# Patient Record
Sex: MALE | Race: WHITE | NOT HISPANIC OR LATINO | Employment: FULL TIME | URBAN - NONMETROPOLITAN AREA
[De-identification: names, ages, dates, MRNs, and addresses within clinical notes are randomized per-mention and may not be internally consistent; named-entity substitution may affect disease eponyms.]

---

## 2017-01-18 DIAGNOSIS — N40.0 BENIGN NON-NODULAR PROSTATIC HYPERPLASIA WITHOUT LOWER URINARY TRACT SYMPTOMS: ICD-10-CM

## 2017-01-18 DIAGNOSIS — E55.9 VITAMIN D DEFICIENCY DISEASE: ICD-10-CM

## 2017-01-18 DIAGNOSIS — Z12.11 SCREENING FOR COLON CANCER: ICD-10-CM

## 2017-01-18 DIAGNOSIS — E78.5 HYPERLIPIDEMIA, UNSPECIFIED HYPERLIPIDEMIA TYPE: Primary | ICD-10-CM

## 2017-01-19 ENCOUNTER — TELEPHONE (OUTPATIENT)
Dept: INTERNAL MEDICINE | Facility: CLINIC | Age: 72
End: 2017-01-19

## 2017-01-19 NOTE — TELEPHONE ENCOUNTER
----- Message from Boni Pearson MD sent at 1/18/2017  5:32 PM EST -----  Contact: PATIENT  Lab order placed  ----- Message -----     From: Rama Coelho MA     Sent: 1/18/2017  11:30 AM       To: Boni Pearson MD        ----- Message -----     From: Dragan Arndt     Sent: 1/18/2017   8:53 AM       To: Rama Coeloh MA    Patient scheduled and annual physical in March. He is inquiring if Dr. Pearson will place his blood work a week in advanced so that he can get this done and have the results when he arrives for his appointment. States this is what he has done for the past 6 or 7 years. I told him him you would give him a call to advise.

## 2017-03-16 ENCOUNTER — LAB (OUTPATIENT)
Dept: INTERNAL MEDICINE | Facility: CLINIC | Age: 72
End: 2017-03-16

## 2017-03-16 DIAGNOSIS — N40.0 BENIGN NON-NODULAR PROSTATIC HYPERPLASIA WITHOUT LOWER URINARY TRACT SYMPTOMS: ICD-10-CM

## 2017-03-16 DIAGNOSIS — E55.9 VITAMIN D DEFICIENCY DISEASE: ICD-10-CM

## 2017-03-16 DIAGNOSIS — E78.5 HYPERLIPIDEMIA, UNSPECIFIED HYPERLIPIDEMIA TYPE: ICD-10-CM

## 2017-03-16 LAB
25(OH)D3 SERPL-MCNC: 30.4 NG/ML
ALBUMIN SERPL-MCNC: 4.7 G/DL (ref 3.2–4.8)
ALBUMIN/GLOB SERPL: 2 G/DL (ref 1.5–2.5)
ALP SERPL-CCNC: 73 U/L (ref 25–100)
ALT SERPL W P-5'-P-CCNC: 15 U/L (ref 7–40)
ANION GAP SERPL CALCULATED.3IONS-SCNC: 9 MMOL/L (ref 3–11)
ARTICHOKE IGE QN: 69 MG/DL (ref 0–130)
AST SERPL-CCNC: 19 U/L (ref 0–33)
BACTERIA UR QL AUTO: ABNORMAL /HPF
BASOPHILS # BLD AUTO: 0.02 10*3/MM3 (ref 0–0.2)
BASOPHILS NFR BLD AUTO: 0.4 % (ref 0–1)
BILIRUB SERPL-MCNC: 0.9 MG/DL (ref 0.3–1.2)
BILIRUB UR QL STRIP: NEGATIVE
BUN BLD-MCNC: 20 MG/DL (ref 9–23)
BUN/CREAT SERPL: 22.2 (ref 7–25)
CALCIUM SPEC-SCNC: 10.4 MG/DL (ref 8.7–10.4)
CHLORIDE SERPL-SCNC: 104 MMOL/L (ref 99–109)
CHOLEST SERPL-MCNC: 136 MG/DL (ref 0–200)
CK SERPL-CCNC: 53 U/L (ref 26–174)
CLARITY UR: CLEAR
CO2 SERPL-SCNC: 29 MMOL/L (ref 20–31)
COLOR UR: YELLOW
CREAT BLD-MCNC: 0.9 MG/DL (ref 0.6–1.3)
DEPRECATED RDW RBC AUTO: 47.8 FL (ref 37–54)
EOSINOPHIL # BLD AUTO: 0.09 10*3/MM3 (ref 0.1–0.3)
EOSINOPHIL NFR BLD AUTO: 1.7 % (ref 0–3)
ERYTHROCYTE [DISTWIDTH] IN BLOOD BY AUTOMATED COUNT: 14 % (ref 11.3–14.5)
GFR SERPL CREATININE-BSD FRML MDRD: 84 ML/MIN/1.73
GLOBULIN UR ELPH-MCNC: 2.4 GM/DL
GLUCOSE BLD-MCNC: 111 MG/DL (ref 70–100)
GLUCOSE UR STRIP-MCNC: NEGATIVE MG/DL
HCT VFR BLD AUTO: 44.3 % (ref 38.9–50.9)
HDLC SERPL-MCNC: 54 MG/DL (ref 40–60)
HGB BLD-MCNC: 14.5 G/DL (ref 13.1–17.5)
HGB UR QL STRIP.AUTO: NEGATIVE
HYALINE CASTS UR QL AUTO: ABNORMAL /LPF
IMM GRANULOCYTES # BLD: 0.01 10*3/MM3 (ref 0–0.03)
IMM GRANULOCYTES NFR BLD: 0.2 % (ref 0–0.6)
KETONES UR QL STRIP: NEGATIVE
LEUKOCYTE ESTERASE UR QL STRIP.AUTO: ABNORMAL
LYMPHOCYTES # BLD AUTO: 1.23 10*3/MM3 (ref 0.6–4.8)
LYMPHOCYTES NFR BLD AUTO: 23.5 % (ref 24–44)
MCH RBC QN AUTO: 30.9 PG (ref 27–31)
MCHC RBC AUTO-ENTMCNC: 32.7 G/DL (ref 32–36)
MCV RBC AUTO: 94.5 FL (ref 80–99)
MONOCYTES # BLD AUTO: 0.49 10*3/MM3 (ref 0–1)
MONOCYTES NFR BLD AUTO: 9.4 % (ref 0–12)
NEUTROPHILS # BLD AUTO: 3.4 10*3/MM3 (ref 1.5–8.3)
NEUTROPHILS NFR BLD AUTO: 64.8 % (ref 41–71)
NITRITE UR QL STRIP: NEGATIVE
NRBC BLD MANUAL-RTO: 0 /100 WBC (ref 0–0)
PH UR STRIP.AUTO: 6 [PH] (ref 5–8)
PLATELET # BLD AUTO: 158 10*3/MM3 (ref 150–450)
PMV BLD AUTO: 11.3 FL (ref 6–12)
POTASSIUM BLD-SCNC: 4.4 MMOL/L (ref 3.5–5.5)
PROT SERPL-MCNC: 7.1 G/DL (ref 5.7–8.2)
PROT UR QL STRIP: NEGATIVE
PSA SERPL-MCNC: 1.2 NG/ML (ref 0–4)
RBC # BLD AUTO: 4.69 10*6/MM3 (ref 4.2–5.76)
RBC # UR: ABNORMAL /HPF
REF LAB TEST METHOD: ABNORMAL
SODIUM BLD-SCNC: 142 MMOL/L (ref 132–146)
SP GR UR STRIP: 1.02 (ref 1–1.03)
SQUAMOUS #/AREA URNS HPF: ABNORMAL /HPF
TRIGL SERPL-MCNC: 76 MG/DL (ref 0–150)
TSH SERPL DL<=0.05 MIU/L-ACNC: 1.41 MIU/ML (ref 0.35–5.35)
UROBILINOGEN UR QL STRIP: ABNORMAL
WBC NRBC COR # BLD: 5.24 10*3/MM3 (ref 3.5–10.8)
WBC UR QL AUTO: ABNORMAL /HPF

## 2017-03-16 PROCEDURE — 82550 ASSAY OF CK (CPK): CPT | Performed by: INTERNAL MEDICINE

## 2017-03-16 PROCEDURE — 80061 LIPID PANEL: CPT | Performed by: INTERNAL MEDICINE

## 2017-03-16 PROCEDURE — 36415 COLL VENOUS BLD VENIPUNCTURE: CPT | Performed by: INTERNAL MEDICINE

## 2017-03-16 PROCEDURE — 81001 URINALYSIS AUTO W/SCOPE: CPT | Performed by: INTERNAL MEDICINE

## 2017-03-16 PROCEDURE — 82306 VITAMIN D 25 HYDROXY: CPT | Performed by: INTERNAL MEDICINE

## 2017-03-16 PROCEDURE — 84153 ASSAY OF PSA TOTAL: CPT | Performed by: INTERNAL MEDICINE

## 2017-03-16 PROCEDURE — 85025 COMPLETE CBC W/AUTO DIFF WBC: CPT | Performed by: INTERNAL MEDICINE

## 2017-03-16 PROCEDURE — 84443 ASSAY THYROID STIM HORMONE: CPT | Performed by: INTERNAL MEDICINE

## 2017-03-16 PROCEDURE — 80053 COMPREHEN METABOLIC PANEL: CPT | Performed by: INTERNAL MEDICINE

## 2017-03-24 ENCOUNTER — OFFICE VISIT (OUTPATIENT)
Dept: INTERNAL MEDICINE | Facility: CLINIC | Age: 72
End: 2017-03-24

## 2017-03-24 VITALS
HEART RATE: 56 BPM | RESPIRATION RATE: 14 BRPM | BODY MASS INDEX: 31.18 KG/M2 | DIASTOLIC BLOOD PRESSURE: 70 MMHG | HEIGHT: 66 IN | WEIGHT: 194 LBS | TEMPERATURE: 98 F | OXYGEN SATURATION: 99 % | SYSTOLIC BLOOD PRESSURE: 120 MMHG

## 2017-03-24 DIAGNOSIS — E11.8 TYPE 2 DIABETES MELLITUS WITH COMPLICATION, WITHOUT LONG-TERM CURRENT USE OF INSULIN (HCC): ICD-10-CM

## 2017-03-24 DIAGNOSIS — G47.30 SLEEP APNEA, UNSPECIFIED TYPE: ICD-10-CM

## 2017-03-24 DIAGNOSIS — I15.9 SECONDARY HYPERTENSION: ICD-10-CM

## 2017-03-24 DIAGNOSIS — E55.9 VITAMIN D DEFICIENCY: ICD-10-CM

## 2017-03-24 DIAGNOSIS — N40.0 BENIGN NON-NODULAR PROSTATIC HYPERPLASIA WITHOUT LOWER URINARY TRACT SYMPTOMS: ICD-10-CM

## 2017-03-24 DIAGNOSIS — E78.5 HYPERLIPIDEMIA, UNSPECIFIED HYPERLIPIDEMIA TYPE: ICD-10-CM

## 2017-03-24 DIAGNOSIS — I25.10 CORONARY ARTERIOSCLEROSIS IN NATIVE ARTERY: Primary | ICD-10-CM

## 2017-03-24 DIAGNOSIS — F52.21 ED (ERECTILE DYSFUNCTION) OF NON-ORGANIC ORIGIN: ICD-10-CM

## 2017-03-24 DIAGNOSIS — K21.9 GASTROESOPHAGEAL REFLUX DISEASE, ESOPHAGITIS PRESENCE NOT SPECIFIED: ICD-10-CM

## 2017-03-24 DIAGNOSIS — E29.1 HYPOGONADISM IN MALE: ICD-10-CM

## 2017-03-24 PROCEDURE — 99213 OFFICE O/P EST LOW 20 MIN: CPT | Performed by: INTERNAL MEDICINE

## 2017-03-24 PROCEDURE — G0439 PPPS, SUBSEQ VISIT: HCPCS | Performed by: INTERNAL MEDICINE

## 2017-03-24 RX ORDER — MULTIPLE VITAMINS W/ MINERALS TAB 9MG-400MCG
1 TAB ORAL DAILY
COMMUNITY

## 2017-03-24 NOTE — PROGRESS NOTES
QUICK REFERENCE INFORMATION:  The ABCs of the Annual Wellness Visit    Subsequent Medicare Wellness Visit    HEALTH RISK ASSESSMENT    1945    Recent Hospitalizations:  No recent hospitalization(s)..        Current Medical Providers:  Patient Care Team:  Boni Pearson MD as PCP - General        Smoking Status:  History   Smoking Status   • Former Smoker   Smokeless Tobacco   • Not on file       Alcohol Consumption:  History   Alcohol use Not on file       Depression Screen:   No flowsheet data found.    Health Habits and Functional and Cognitive Screening:  No flowsheet data found.           Does the patient have evidence of cognitive impairment? No    Asprin use counseling:no      Recent Lab Results:  CMP:  Lab Results   Component Value Date     (H) 06/19/2015    BUN 20 03/16/2017    CREATININE 0.90 03/16/2017    EGFRIFNONA 84 03/16/2017    EGFRIFAFRI 107 06/19/2015    BCR 22.2 03/16/2017     03/16/2017    K 4.4 03/16/2017    CO2 29.0 03/16/2017    CALCIUM 10.4 03/16/2017    PROTENTOTREF 6.3 06/19/2015    ALBUMIN 4.70 03/16/2017    LABGLOBREF 1.7 06/19/2015    LABIL2 2.0 03/16/2017    BILITOT 0.9 03/16/2017    ALKPHOS 73 03/16/2017    AST 19 03/16/2017    ALT 15 03/16/2017     Lipid Panel:  Lab Results   Component Value Date    CHLPL 171 02/29/2016    TRIG 76 03/16/2017    HDL 54 03/16/2017    VLDL 10 06/19/2015    LDL 56 06/19/2015     LDL:     HbA1c:  Lab Results   Component Value Date    HGBA1C 6.7 (H) 02/29/2016     Urine Microalbumin:     Visual Acuity:  No exam data present    Age-appropriate Screening Schedule:  Refer to the list below for future screening recommendations based on patient's age, sex and/or medical conditions. Orders for these recommended tests are listed in the plan section. The patient has been provided with a written plan.    Health Maintenance   Topic Date Due   • PNEUMOCOCCAL VACCINES (65+ LOW/MEDIUM RISK) (2 of 2 - PPSV23) 08/21/2016   • DIABETIC FOOT EXAM  12/02/2016    • HEMOGLOBIN A1C  12/02/2016   • DIABETIC EYE EXAM  12/02/2016   • URINE MICROALBUMIN  12/02/2016   • LIPID PANEL  03/16/2018   • TDAP/TD VACCINES (2 - Td) 08/23/2023   • COLONOSCOPY  02/20/2025   • INFLUENZA VACCINE  Completed   • ZOSTER VACCINE  Completed        Subjective   History of Present Illness    Victor M Acuna is a 71 y.o. male who presents for an Subsequent Wellness Visit.    The following portions of the patient's history were reviewed and updated as appropriate: allergies, current medications, past family history, past medical history, past social history, past surgical history and problem list.    Outpatient Medications Prior to Visit   Medication Sig Dispense Refill   • aspirin 81 MG tablet Take  by mouth daily.     • Cholecalciferol (VITAMIN D3) 2000 UNITS capsule Take  by mouth.     • clopidogrel (PLAVIX) 75 MG tablet Take  by mouth.     • metoprolol succinate XL (TOPROL-XL) 25 MG 24 hr tablet Take 1 tablet by mouth daily.     • Pseudoephedrine-Guaifenesin -1200 MG tablet sustained-release 12 hour Take 1 tablet by mouth as needed.     • ramipril (ALTACE) 5 MG capsule Take 1 capsule by mouth daily.     • simvastatin (ZOCOR) 40 MG tablet Take 1 tablet by mouth every night.       No facility-administered medications prior to visit.        Patient Active Problem List   Diagnosis   • Abnormal liver enzymes   • Allergic state   • Coronary arteriosclerosis in native artery   • Benign colonic polyp   • Benign prostatic hyperplasia   • Simple renal cyst   • S/P CABG (coronary artery bypass graft)   • Diabetes mellitus   • Erectile dysfunction of nonorganic origin   • Gastroesophageal reflux disease   • Hyperlipidemia   • Hypertension   • Abscess of lung   • Calculus of kidney   • Peptic ulcer   • Sleep apnea   • Hernia of anterior abdominal wall   • Vitamin D deficiency   • Benign prostatic hypertrophy   • ED (erectile dysfunction) of non-organic origin   • GERD (gastroesophageal reflux disease)    • Allergic rhinitis   • Hyperglycemia   • Peptic ulceration   • Osteoarthritis       Advanced Care Planning:  has an advanced directive - a copy HAS NOT been provided    Identification of Risk Factors:  Risk factors include: weight  and unhealthy diet.    Review of Systems   Constitutional: Negative.    HENT: Negative.    Eyes: Negative.    Respiratory: Negative.    Cardiovascular: Negative.    Gastrointestinal: Negative.    Endocrine: Negative.    Genitourinary: Negative.    Musculoskeletal: Negative.    Skin: Negative.    Allergic/Immunologic: Negative.    Neurological: Negative.    Hematological: Negative.    Psychiatric/Behavioral: Negative.    All other systems reviewed and are negative.      Compared to one year ago, the patient feels his physical health is the same.  Compared to one year ago, the patient feels his mental health is the same.    Objective     Physical Exam   Constitutional: He is oriented to person, place, and time. He appears well-developed and well-nourished.   HENT:   Head: Normocephalic and atraumatic.   Right Ear: External ear normal.   Left Ear: External ear normal.   Nose: Nose normal.   Mouth/Throat: Oropharynx is clear and moist.   Eyes: Conjunctivae and EOM are normal. Pupils are equal, round, and reactive to light.   Neck: Normal range of motion. Neck supple. No thyromegaly present.   Cardiovascular: Normal rate, regular rhythm, normal heart sounds and intact distal pulses.    Pulmonary/Chest: Effort normal and breath sounds normal.   Abdominal: Soft. Bowel sounds are normal.   Genitourinary: Rectum normal, prostate normal and penis normal.   Musculoskeletal: Normal range of motion.   Neurological: He is alert and oriented to person, place, and time. He has normal reflexes.   Skin: Skin is warm and dry.   Psychiatric: He has a normal mood and affect. His behavior is normal. Judgment and thought content normal.   Nursing note and vitals reviewed.      Vitals:    03/24/17 1036   BP:  "120/70   Pulse: 56   Resp: 14   Temp: 98 °F (36.7 °C)   SpO2: 99%   Weight: 194 lb (88 kg)   Height: 66\" (167.6 cm)       Body mass index is 31.31 kg/(m^2).  Discussed the patient's BMI with him. The BMI is above average; BMI management plan is completed.    Assessment/Plan   Patient Self-Management and Personalized Health Advice  The patient has been provided with information about: diet, exercise and weight management and preventive services including:   · Advanced directives: has an advanced directive - a copy HAS NOT been provided.    Visit Diagnoses:  No diagnosis found.    No orders of the defined types were placed in this encounter.      Outpatient Encounter Prescriptions as of 3/24/2017   Medication Sig Dispense Refill   • aspirin 81 MG tablet Take  by mouth daily.     • Cholecalciferol (VITAMIN D3) 2000 UNITS capsule Take  by mouth.     • clopidogrel (PLAVIX) 75 MG tablet Take  by mouth.     • metoprolol succinate XL (TOPROL-XL) 25 MG 24 hr tablet Take 1 tablet by mouth daily.     • Multiple Vitamins-Minerals (MULTIVITAMIN WITH MINERALS) tablet tablet Take 1 tablet by mouth Daily.     • Pseudoephedrine-Guaifenesin -1200 MG tablet sustained-release 12 hour Take 1 tablet by mouth as needed.     • ramipril (ALTACE) 5 MG capsule Take 1 capsule by mouth daily.     • simvastatin (ZOCOR) 40 MG tablet Take 1 tablet by mouth every night.       No facility-administered encounter medications on file as of 3/24/2017.        Reviewed use of high risk medication in the elderly: yes  Reviewed for potential of harmful drug interactions in the elderly: no    Follow Up:  No Follow-up on file.     An After Visit Summary and PPPS with all of these plans were given to the patient.       diverticulosis   LUQ discomfort while eating for 10' CT NSD for 3-4 years  ED , history of low normal testo  Loss of libido do testo level---  left foot cone refer to podiatrist   Kidney stone continue to watch  Kidney cysts multiloculated " and kidney ca s/p removal doing well, follow up with uro.  gall stone continue watch without symptoms right now  diabetes a1c repeat --  obesity weight loss on purpose continue.  History of tubular adenoma diverticulosis and recheck colonoscopy 2/2020  Vitamin D deficiency continue supplement  CAD recently PCI, history of CABG followup with cardiologist. Continue cholesterol medication follow up with cardio  GERD stable on medication  Abnormal liver enzymes normal after cut down zocor dose  Sleep apnea continue CPAP  HTN continue med  prevnar done, [id number], vaccine all up to date  2 mo follow up weight , ED , vitD, LUQ pain and left foot pain

## 2017-03-28 ENCOUNTER — LAB (OUTPATIENT)
Dept: INTERNAL MEDICINE | Facility: CLINIC | Age: 72
End: 2017-03-28

## 2017-03-28 DIAGNOSIS — E11.8 TYPE 2 DIABETES MELLITUS WITH COMPLICATION, WITHOUT LONG-TERM CURRENT USE OF INSULIN (HCC): ICD-10-CM

## 2017-03-28 DIAGNOSIS — E29.1 HYPOGONADISM IN MALE: ICD-10-CM

## 2017-03-29 ENCOUNTER — RESULTS ENCOUNTER (OUTPATIENT)
Dept: INTERNAL MEDICINE | Facility: CLINIC | Age: 72
End: 2017-03-29

## 2017-03-29 DIAGNOSIS — E11.8 TYPE 2 DIABETES MELLITUS WITH COMPLICATION, WITHOUT LONG-TERM CURRENT USE OF INSULIN (HCC): ICD-10-CM

## 2017-03-29 LAB
CONV COMMENT: NORMAL
HBA1C MFR BLD: 6 %
MICROALBUMIN UR-MCNC: 164.8 UG/ML
TESTOST SERPL-MCNC: 408 NG/DL (ref 348–1197)

## 2017-03-30 LAB
EXPIRATION DATE 2: NORMAL
EXPIRATION DATE 3: NORMAL
EXPIRATION DATE: NORMAL
GASTROCULT GAST QL: NEGATIVE
HEMOCCULT SP2 STL QL: NEGATIVE
HEMOCCULT SP3 STL QL: NEGATIVE
Lab: NORMAL

## 2017-03-30 PROCEDURE — 82270 OCCULT BLOOD FECES: CPT | Performed by: INTERNAL MEDICINE

## 2017-04-04 ENCOUNTER — TELEPHONE (OUTPATIENT)
Dept: INTERNAL MEDICINE | Facility: CLINIC | Age: 72
End: 2017-04-04

## 2017-04-04 NOTE — TELEPHONE ENCOUNTER
----- Message from Farheen Benson sent at 4/4/2017  8:33 AM EDT -----  Contact: PATIENT  PATIENT CALLED AND SAID THAT HE RECEIVED THE RESULTS OF HIS LABS. HE SAID THAT IT SAID ON THEM THAT DR. MILNER WANTED TO DISCUSS THEM WITH HIM. HE WAS CALLING TO SEE IF HE COULD SPEAK WITH DR. MILNER ABOUT HIS LABS. HE SAID YOU CAN REACH HIM [phone] TODAY.

## 2017-09-05 DIAGNOSIS — M25.561 RIGHT KNEE PAIN, UNSPECIFIED CHRONICITY: Primary | ICD-10-CM

## 2017-09-06 ENCOUNTER — OFFICE VISIT (OUTPATIENT)
Dept: ORTHOPEDIC SURGERY | Facility: CLINIC | Age: 72
End: 2017-09-06

## 2017-09-06 VITALS — BODY MASS INDEX: 30.53 KG/M2 | RESPIRATION RATE: 18 BRPM | HEIGHT: 66 IN | WEIGHT: 190 LBS

## 2017-09-06 DIAGNOSIS — M25.561 ARTHRALGIA OF RIGHT KNEE: ICD-10-CM

## 2017-09-06 DIAGNOSIS — M11.262 PSEUDOGOUT OF KNEE, LEFT: ICD-10-CM

## 2017-09-06 DIAGNOSIS — M17.4 OTHER SECONDARY OSTEOARTHRITIS OF BOTH KNEES: Primary | ICD-10-CM

## 2017-09-06 DIAGNOSIS — M25.561 PAIN AND SWELLING OF RIGHT KNEE: ICD-10-CM

## 2017-09-06 DIAGNOSIS — S83.209A MENISCUS TEAR: ICD-10-CM

## 2017-09-06 DIAGNOSIS — M25.461 PAIN AND SWELLING OF RIGHT KNEE: ICD-10-CM

## 2017-09-06 DIAGNOSIS — M11.261 PSEUDOGOUT OF KNEE, RIGHT: ICD-10-CM

## 2017-09-06 PROCEDURE — 99202 OFFICE O/P NEW SF 15 MIN: CPT | Performed by: ORTHOPAEDIC SURGERY

## 2017-09-06 PROCEDURE — 73560 X-RAY EXAM OF KNEE 1 OR 2: CPT | Performed by: ORTHOPAEDIC SURGERY

## 2017-09-06 NOTE — PROGRESS NOTES
Subjective   Patient ID: Victor M Acuna is a 71 y.o. right hand dominant male is being seen for orthopaedic evaluation today for right knee pain  Pain of the Right Knee    Lower Extremity Issue   This is a new problem. The current episode started more than 1 month ago (6-12-17 he reports twisting knee injury at home.  He has a persistent low level aching of the knee.  His level of activity and exercise remains good with typical 4 mile walk 4x a week without change or any limitation.). The problem occurs intermittently. The problem has been waxing and waning. Associated symptoms include arthralgias. Pertinent negatives include no abdominal pain, chest pain, fever, joint swelling or rash. He has tried rest (knee brace) for the symptoms. The treatment provided moderate relief.        Pain Score: 1  Pain Location: Knee  Pain Orientation: Right  Pain Descriptors: Aching  Pain Frequency: Intermittent  Date Pain First Started: 06/12/17  Pain Intervention(s):  (Brace )  Result of Injury: Yes (patient reports twisting his knee at home.)    Past Medical History:   Diagnosis Date   • Abdominal pain, left lower quadrant    • Abnormal liver enzymes    • Benign colonic polyp    • Bilateral renal cysts    • CAD (coronary artery disease)    • Chest pain    • History of kidney cancer    • Lung abscess    • Osteoarthritis    • Shortness of breath    • Sprain    • Urinary tract infection    • Ventral hernia         Past Surgical History:   Procedure Laterality Date   • CORONARY ARTERY BYPASS GRAFT  09/11/2011   • KIDNEY SURGERY Right     growth removal    • PILONIDAL CYSTECTOMY  1970   • SEPTOPLASTY  1971       Family History   Problem Relation Age of Onset   • Heart disease Mother         Social History     Social History   • Marital status:      Spouse name: N/A   • Number of children: N/A   • Years of education: N/A     Occupational History   •       [employer]     Social History Main Topics   •  "Smoking status: Former Smoker   • Smokeless tobacco: Not on file   • Alcohol use No   • Drug use: No   • Sexual activity: Defer     Other Topics Concern   • Not on file     Social History Narrative       No Known Allergies    Review of Systems   Constitutional: Negative for fever.   HENT: Negative for voice change.    Eyes: Negative for visual disturbance.   Respiratory: Negative for shortness of breath.    Cardiovascular: Negative for chest pain.   Gastrointestinal: Negative for abdominal distention and abdominal pain.   Genitourinary: Negative for dysuria.   Musculoskeletal: Positive for arthralgias. Negative for gait problem and joint swelling.   Skin: Negative for rash.   Neurological: Negative for speech difficulty.   Hematological: Does not bruise/bleed easily.   Psychiatric/Behavioral: Negative for confusion.       Objective   Resp 18  Ht 66\" (167.6 cm)  Wt 190 lb (86.2 kg)  BMI 30.67 kg/m2   Physical Exam   Constitutional: He appears well-developed. No distress.   Musculoskeletal:        Right knee: He exhibits effusion (1+).   Neurological: He is alert.   Skin: Skin is warm and dry. No rash noted. No erythema.   Psychiatric: He has a normal mood and affect. His speech is normal and behavior is normal. Judgment and thought content normal.   Vitals reviewed.    Right Knee Exam     Tenderness   The patient is experiencing tenderness in the medial retinaculum and medial joint line.    Range of Motion   Extension: 0   Flexion: 110     Tests   Andrea:  Medial - positive   Drawer:       Anterior - 1+    Posterior - 1+  Varus: negative  Valgus: negative  Patellar Apprehension: negative    Other   Erythema: absent  Sensation: normal  Pulse: present  Other tests: effusion (1+) present        Extremity DVT signs are Negative on physical exam with negative Basilia sign, with no calf pain and with no palpable cords   Neurologic Exam     Mental Status   Attention: normal.   Speech: speech is normal   Level of " consciousness: alert  Knowledge: good.     Motor Exam   Overall muscle tone: normal    Gait, Coordination, and Reflexes     Gait  Gait: (occasional mild right knee antalgic limp)     Ortho Exam      Assessment/Plan   Independent Review of Radiographic Studies:    Indication to evaluate pain, no comparison views, shows no acute fracture or dislocation evident.  Indication to evaluate joint condition, no comparison views available, shows evident mild osteoarthritis with calcific stippling outlining the menisci.  Calcium deposits were noted x-ray consistent with CPPD pseudogout.  Laboratory and Other Studies:  No new results reviewed today.   Medical Decision Making:    Ongoing with residual symptoms.  Continue current management and any additional treatments and workup as outlined in plan.    Procedures  [x] No procedures were performed in office today.    Victor M was seen today for pain.    Diagnoses and all orders for this visit:    Other secondary osteoarthritis of both knees    Arthralgia of right knee    Pseudogout of knee, right    Pseudogout of knee, left    Pain and swelling of right knee  -     MRI Knee Right Without Contrast    Meniscus tear  -     MRI Knee Right Without Contrast       Regular exercise as tolerated  Orthopedic activities reviewed and patient expressed appreciation  Discussion of orthopedic goals  Risk, benefits, and merits of treatment alternatives reviewed with the patient and questions answered  Guided on proper techniques for mobility, strength, agility and/or conditioning exercises    Recommendations/Plan:  Exercise, medications, injections, other patient advice, and return appointment as noted.  Brace: No brace was given at today's visit  Referral: No referrals made at today's visit  Test/Studies: MRI Right knee  Surgery: No surgery proposed at this visit.  Work/Activity Status: May perform usual activities as tolerated  Patient is encouraged to call or return for any issues or  concerns.    Return in about 3 weeks (around 9/27/2017) for Review MRI, repeat knee exam, Recheck.  Patient agreeable to call or return sooner for any concerns.

## 2017-09-15 ENCOUNTER — HOSPITAL ENCOUNTER (OUTPATIENT)
Dept: MRI IMAGING | Facility: HOSPITAL | Age: 72
Discharge: HOME | End: 2017-09-15
Attending: ORTHOPAEDIC SURGERY | Admitting: ORTHOPAEDIC SURGERY

## 2017-09-15 ENCOUNTER — TELEPHONE (OUTPATIENT)
Dept: ORTHOPEDIC SURGERY | Facility: CLINIC | Age: 72
End: 2017-09-15

## 2017-09-15 PROCEDURE — 73721 MRI JNT OF LWR EXTRE W/O DYE: CPT

## 2017-09-25 ENCOUNTER — OFFICE VISIT (OUTPATIENT)
Dept: ORTHOPEDIC SURGERY | Facility: CLINIC | Age: 72
End: 2017-09-25

## 2017-09-25 VITALS — HEIGHT: 66 IN | WEIGHT: 209.1 LBS | BODY MASS INDEX: 33.61 KG/M2 | RESPIRATION RATE: 16 BRPM

## 2017-09-25 DIAGNOSIS — M25.561 ARTHRALGIA OF RIGHT KNEE: ICD-10-CM

## 2017-09-25 DIAGNOSIS — M11.262 PSEUDOGOUT OF KNEE, LEFT: ICD-10-CM

## 2017-09-25 DIAGNOSIS — M17.4 OTHER SECONDARY OSTEOARTHRITIS OF BOTH KNEES: ICD-10-CM

## 2017-09-25 DIAGNOSIS — M11.261 PSEUDOGOUT OF KNEE, RIGHT: ICD-10-CM

## 2017-09-25 DIAGNOSIS — M23.221 DERANGEMENT OF POSTERIOR HORN OF MEDIAL MENISCUS DUE TO OLD TEAR OR INJURY, RIGHT KNEE: Primary | ICD-10-CM

## 2017-09-25 PROCEDURE — 99213 OFFICE O/P EST LOW 20 MIN: CPT | Performed by: ORTHOPAEDIC SURGERY

## 2017-09-25 NOTE — PROGRESS NOTES
"Victor M Acuna is a 71 y.o. {16TC Arm Dominance:07751} male referred by [unfilled]  {16TC Visit Reason H&P:77695::\"is here today for a discussion of treatment plans, surgery, and rehabilitation.\"}  Results of the Right Knee (MRI results)       History of Present Illness      {16TC HPI:40779}    PAST MEDICAL HISTORY:    Past Medical History:   Diagnosis Date   • Abdominal pain, left lower quadrant    • Abnormal liver enzymes    • Benign colonic polyp    • Bilateral renal cysts    • CAD (coronary artery disease)    • Chest pain    • History of kidney cancer    • Lung abscess    • Osteoarthritis    • Shortness of breath    • Sprain    • Urinary tract infection    • Ventral hernia          Current Outpatient Prescriptions:   •  aspirin 81 MG tablet, Take  by mouth daily., Disp: , Rfl:   •  Cholecalciferol (VITAMIN D3) 5000 units capsule capsule, Take 5,000 Units by mouth Daily., Disp: , Rfl:   •  clopidogrel (PLAVIX) 75 MG tablet, Take  by mouth., Disp: , Rfl:   •  diclofenac (VOLTAREN) 75 MG EC tablet, Take 1 tablet by mouth 2 (Two) Times a Day. Take with food., Disp: 60 tablet, Rfl: 0  •  metoprolol succinate XL (TOPROL-XL) 25 MG 24 hr tablet, Take 1 tablet by mouth daily., Disp: , Rfl:   •  metoprolol tartrate (LOPRESSOR) 25 MG tablet, TAKE 1 TABLET BY MOUTH DAILY, Disp: , Rfl: 3  •  Multiple Vitamins-Minerals (MULTIVITAMIN WITH MINERALS) tablet tablet, Take 1 tablet by mouth Daily., Disp: , Rfl:   •  Pseudoephedrine-Guaifenesin -1200 MG tablet sustained-release 12 hour, Take 1 tablet by mouth as needed., Disp: , Rfl:   •  ramipril (ALTACE) 5 MG capsule, Take 1 capsule by mouth daily., Disp: , Rfl:     Past Surgical History:   Procedure Laterality Date   • CORONARY ARTERY BYPASS GRAFT  09/11/2011   • KIDNEY SURGERY Right     growth removal    • PILONIDAL CYSTECTOMY  1970   • SEPTOPLASTY  1971       Family History   Problem Relation Age of Onset   • Heart disease Mother        Social History     Social " "History   • Marital status:      Spouse name: N/A   • Number of children: N/A   • Years of education: N/A     Occupational History   •       [employer]     Social History Main Topics   • Smoking status: Former Smoker   • Smokeless tobacco: Not on file   • Alcohol use No   • Drug use: No   • Sexual activity: Defer     Other Topics Concern   • Not on file     Social History Narrative        No Known Allergies    Review of Systems    PHYSICAL EXAMINATION:       Resp 16  Ht 66\" (167.6 cm)  Wt 209 lb 1.6 oz (94.8 kg)  BMI 33.75 kg/m2    GENERAL [x] Well developed  []Ill appearing [x] No acute distress    HEENT [x]No acute changes [x] Normocephalic, atraumatic    NECK [x]Supple  [] No midline tenderness    LUNGS [x]Clear bilaterally [x]No wheezes []Rhonchi [] Rales    HEART [x] Regular rate  [x] Regular rhythm [] Irregular    ABDOMEN [x] Soft [x] Not tender [x] Not distended [x] Normal sounds    VAS/EXT [x] Normal Pulses []Edema []Cyanosis             SKIN [x] Warm [x]Dry []Pink []Ecchymosis []Cool    NEURO [x] Sensation Intact [x] Motor Intact [x] Pulse intact  [] Dysesthesias:_________  []Weakness:__________    MUSCULOSKELETAL []          Physical Exam  Ortho Exam  Extremity DVT signs are {16TC Basilia:71459}   Neurologic Exam  Ortho Exam      DVT Screening: No Yes   Smoker [] []   Personal/Family History of DVT or PE [] []   Sedentary Lifestyle [] []   Overweight [] []      {16TC TXA Screen:84529}    Previous or Active DVT/PE: NO  Cardiac Stent within 1 year: NO  Embolic/Ischemic stroke within 1 year: NO  Creatinine less than 30ml/min: NO  Congenital Thrombophilia: NO  Hypersensitivity to TXA: NO  Color Blindness: NO  NOTE:  TXA  tranexemic acid summary: THIS PATIENT IS A CANDIDATE FOR IV TXA DURING SURGERY.    Independent Review of Radiographic Studies:    {TC Imagin}  Laboratory and Other Studies:  {16TC Lab Studies:04654}   Medical Decision Making:    {16TC Clinical Progress, " "Decision Making, and Outcome:37694}        *SPECIAL INSTRUCTIONS:  ***      Risks, benefits, and alternative treatments discussed with the patient: [x] Yes [] No    {16TC Risk Benefit:61514::\"Risk benefits and merits of the proposed surgery were discussed and the patient's questions were answered risks discussed including and not limited to:\",\"Anesthesia reactions\",\"Infection\",\"Deep venous thrombosis and pulmonary embolus\",\"Nerve, vascular or tendon injury\",\"Fracture\",\"Deformity\",\"Stiffness\",\"Weakness\",\"Skin necrosis\",\"Revision surgery or further treatment\",\"Recurrence of problem and condition\"}     Informed consent: [] Signed  [x] To be obtained at hospital  [] Both    There are no diagnoses linked to this encounter.     Recommendations/Plan:    {16TC Patient Advice:42836::\"Regular exercise as tolerated\",\"Orthopedic activities reviewed and patient expressed appreciation\",\"Discussion of orthopedic goals\",\"Risk, benefits, and merits of treatment alternatives reviewed with the patient and questions answered\"}    {16TC Recommendations/Plan:75260::\"Exercise, medications, injections, other patient advice, and return appointment as noted.\",\"Patient is encouraged to call or return for any issues or concerns.\"}    PLANNED SURGICAL PROCEDURE: ***    No Follow-up on file.  "

## 2017-09-25 NOTE — PROGRESS NOTES
Subjective   Patient ID: Victor M Acuna is a 71 y.o. right hand dominant male is here today for orthopaedic evaluation of recovery progress with treatment. and is here today for a treatment planning discussion.  Results of the Right Knee (MRI results)       History of Present Illness    Progress Note:  Patient reports that with treatments and since the last visit, overall pain is decreased, strength is full, and range of motion is full.  Patient is currently using medication diclofenac.   Physical therapy has not been initiated.  Injection therapy has not been given..   Since last visit, patient has been working regular duty. Patient does  present with recent labs or studies (MRI) for review.  Reviewed findings of arthritis and medial meniscus tear with patient and his questions answered.  Patient states the knee symptoms at this point are minor and manageable. He is not inclined to proceed with elective knee arthroscopy or knee injection.  The medication is helping and he is exercising regularly.  Patient also noted he will be retiring in November and moving to [place], [place] with wife and to be with grandchildren and family.     Past Medical History:   Diagnosis Date   • Abdominal pain, left lower quadrant    • Abnormal liver enzymes    • Benign colonic polyp    • Bilateral renal cysts    • CAD (coronary artery disease)    • Chest pain    • History of kidney cancer    • Lung abscess    • Osteoarthritis    • Shortness of breath    • Sprain    • Urinary tract infection    • Ventral hernia         Past Surgical History:   Procedure Laterality Date   • CORONARY ARTERY BYPASS GRAFT  09/11/2011   • KIDNEY SURGERY Right     growth removal    • PILONIDAL CYSTECTOMY  1970   • SEPTOPLASTY  1971        Family History   Problem Relation Age of Onset   • Heart disease Mother         Social History     Social History   • Marital status:      Spouse name: N/A   • Number of children: N/A   • Years of education:  "N/A     Occupational History   •       [employer]     Social History Main Topics   • Smoking status: Former Smoker   • Smokeless tobacco: Not on file   • Alcohol use No   • Drug use: No   • Sexual activity: Defer     Other Topics Concern   • Not on file     Social History Narrative       No Known Allergies    Review of Systems   Constitutional: Negative for fever.   HENT: Negative for voice change.    Eyes: Negative for visual disturbance.   Respiratory: Negative for shortness of breath.    Cardiovascular: Negative for chest pain.   Gastrointestinal: Negative for abdominal distention and abdominal pain.   Genitourinary: Negative for dysuria.   Musculoskeletal: Positive for arthralgias. Negative for gait problem and joint swelling.   Skin: Negative for rash.   Neurological: Negative for speech difficulty.   Hematological: Does not bruise/bleed easily.   Psychiatric/Behavioral: Negative for confusion.   All other systems reviewed and are negative.        Objective   Resp 16  Ht 66\" (167.6 cm)  Wt 209 lb 1.6 oz (94.8 kg)  BMI 33.75 kg/m2    Physical Exam   Constitutional: He appears well-developed. No distress.   Neurological: He is alert. Gait normal.   Skin: Skin is warm and dry. No rash noted. No erythema.   Psychiatric: He has a normal mood and affect. His speech is normal.   Vitals reviewed.    Ortho Exam  Neurologic Exam     Mental Status   Attention: normal.   Speech: speech is normal   Level of consciousness: alert  Knowledge: good.     Motor Exam   Overall muscle tone: normal    Gait, Coordination, and Reflexes     Gait  Gait: normal    Ortho Exam  No acute knee exam changes.    Assessment/Plan   Independent Review of Radiographic Studies:    MRI with evident meniscal tear noted.  Laboratory and Other Studies:  No new results reviewed today.   Medical Decision Making:    Good progress, significantly improved.  Continue current plan, and management.    Procedures  [x]  No procedures were " performed in office today.    Victor M was seen today for results.    Diagnoses and all orders for this visit:    Derangement of posterior horn of medial meniscus due to old tear or injury, right knee    Other secondary osteoarthritis of both knees    Pseudogout of knee, right    Pseudogout of knee, left    Arthralgia of right knee       Regular exercise as tolerated  Orthopedic activities reviewed and patient expressed appreciation  Discussion of orthopedic goals  Risk, benefits, and merits of treatment alternatives reviewed with the patient and questions answered  Guided on proper techniques for mobility, strength, agility and/or conditioning exercises  Discussed Knee arthroscopy with patient and not warranted at this time with his recent knee improvement.    Recommendations/Plan:  Exercise, medications, injections, other patient advice, and return appointment as noted.  Brace: No brace was given at today's visit  Referral: No referrals made at today's visit  Test/Studies: No additional studies ordered.  Surgery: No surgery proposed at this visit.  Work/Activity Status: May perform usual activities as tolerated  Patient is encouraged to call or return for any issues or concerns.   Patient doesn't want to pursue knee arthroscopy at this time.    Return if symptoms worsen or fail to improve.  Patient agreeable to call or return sooner for any concerns.         Scribed for Roger Keith MD by Divya Bonilla R.N.. 9/28/2017  4:24 PM

## 2017-10-06 ENCOUNTER — FLU SHOT (OUTPATIENT)
Dept: INTERNAL MEDICINE | Facility: CLINIC | Age: 72
End: 2017-10-06

## 2017-10-06 PROCEDURE — 90471 IMMUNIZATION ADMIN: CPT | Performed by: INTERNAL MEDICINE

## 2017-10-06 PROCEDURE — 90662 IIV NO PRSV INCREASED AG IM: CPT | Performed by: INTERNAL MEDICINE

## 2019-01-25 NOTE — TELEPHONE ENCOUNTER
Patient called stating that he has had his MRI and needs to schedule an appoint for results. Patient was requesting a Friday. We scheduled patient on 9/25/17 at 11:15am because we were unable to place him on a Friday. Patient would like for Dr. Keith to call him [phone].  
no